# Patient Record
Sex: MALE | Race: WHITE | NOT HISPANIC OR LATINO | URBAN - METROPOLITAN AREA
[De-identification: names, ages, dates, MRNs, and addresses within clinical notes are randomized per-mention and may not be internally consistent; named-entity substitution may affect disease eponyms.]

---

## 2020-07-25 ENCOUNTER — TELEPHONE ENCOUNTER (OUTPATIENT)
Dept: URBAN - METROPOLITAN AREA CLINIC 13 | Facility: CLINIC | Age: 50
End: 2020-07-25

## 2020-07-25 RX ORDER — PREDNISONE 10 MG/1
4 TABLETS DAILY X 2 WKS, 3 TABLETS DAILY X 1 WK, 2 TABLETS DAILY X 1 WK, 1 TABLET DAILY X 1 WK, THEN 1/2 TABLET DAILY X 1 WK TABLET ORAL
Qty: 102 | Refills: 0 | OUTPATIENT
Start: 2015-06-14 | End: 2016-05-05

## 2020-07-25 RX ORDER — PREDNISONE 20 MG/1
TAKE 2 TABLETS DAILY FOR 7 DAYS; 1 TAB DAILY FOR 7 DAYS; AND 1/2 TAB DAILY FOR 7 DAYS THEN STOP TABLET ORAL
Qty: 56 | Refills: 0 | OUTPATIENT
Start: 2016-05-05 | End: 2016-08-26

## 2020-07-25 RX ORDER — SODIUM SULFATE, POTASSIUM SULFATE, MAGNESIUM SULFATE 17.5; 3.13; 1.6 G/ML; G/ML; G/ML
DILUTE CONTENTS AND USE AS DIRECTED FOR BOWEL PREP SOLUTION, CONCENTRATE ORAL
Qty: 1 | Refills: 0 | OUTPATIENT
Start: 2019-10-29 | End: 2020-04-13

## 2020-07-25 RX ORDER — FENOFIBRATE 120 MG/1
TAKE 1 TABLET DAILY TABLET ORAL
Refills: 0 | OUTPATIENT
End: 2020-04-13

## 2020-07-25 RX ORDER — INFLIXIMAB 100 MG/10ML
USE AS DIRECTED INJECTION, POWDER, LYOPHILIZED, FOR SOLUTION INTRAVENOUS
Refills: 0 | OUTPATIENT
End: 2016-05-03

## 2020-07-25 RX ORDER — INFLIXIMAB 100 MG/10ML
INFUSE 5MG/KG Q 4 WEEKS INJECTION, POWDER, LYOPHILIZED, FOR SOLUTION INTRAVENOUS
Qty: 1 | Refills: 6 | OUTPATIENT
Start: 2016-08-29 | End: 2017-09-01

## 2020-07-25 RX ORDER — CHLORDIAZEPOXIDE HYDROCHLORIDE AND CLIDINIUM BROMIDE 5; 2.5 MG/1; MG/1
TAKE 1 CAPSULE 3 TIMES DAILY AS NEEDED CAPSULE, GELATIN COATED ORAL
Qty: 65 | Refills: 3 | OUTPATIENT
End: 2017-09-01

## 2020-07-25 RX ORDER — CHLORDIAZEPOXIDE HYDROCHLORIDE AND CLIDINIUM BROMIDE 5; 2.5 MG/1; MG/1
TAKE 1 CAPSULE 3 TIMES DAILY PRN PAIN CAPSULE ORAL
Qty: 45 | Refills: 0 | OUTPATIENT
Start: 2015-06-15 | End: 2015-08-19

## 2020-07-25 RX ORDER — PREDNISONE 20 MG/1
TAKE TABLET  PRN TABLET ORAL
Refills: 0 | OUTPATIENT
End: 2020-04-23

## 2020-07-25 RX ORDER — PROMETHAZINE HYDROCHLORIDE 25 MG/1
TAKE 1 TABLET EVERY 6 HOURS AS NEEDED FOR NAUSEA TABLET ORAL
Qty: 40 | Refills: 0 | OUTPATIENT
Start: 2016-05-04 | End: 2017-09-01

## 2020-07-25 RX ORDER — OXYCODONE AND ACETAMINOPHEN 10; 325 MG/1; MG/1
TAKE 1 TABLET EVERY 4 TO 6 HOURS AS NEEDED FOR PAIN TABLET ORAL
Refills: 0 | OUTPATIENT
End: 2016-05-03

## 2020-07-25 RX ORDER — HYOSCYAMINE SULFATE 0.12 MG/1
TAKE 1 TABLET EVERY 8 HOURS PRN TABLET, ORALLY DISINTEGRATING ORAL
Qty: 60 | Refills: 2 | OUTPATIENT
Start: 2015-08-19 | End: 2019-10-29

## 2020-07-26 ENCOUNTER — TELEPHONE ENCOUNTER (OUTPATIENT)
Dept: URBAN - METROPOLITAN AREA CLINIC 13 | Facility: CLINIC | Age: 50
End: 2020-07-26

## 2020-07-26 RX ORDER — METOPROLOL SUCCINATE 25 MG/1
TABLET, EXTENDED RELEASE ORAL
Qty: 30 | Refills: 0 | Status: ACTIVE | COMMUNITY
Start: 2013-12-30

## 2020-07-26 RX ORDER — OXYCODONE AND ACETAMINOPHEN 5; 325 MG/1; MG/1
TABLET ORAL
Qty: 30 | Refills: 0 | Status: ACTIVE | COMMUNITY
Start: 2013-09-02

## 2020-07-26 RX ORDER — OXYCODONE AND ACETAMINOPHEN 10; 325 MG/1; MG/1
TABLET ORAL
Qty: 60 | Refills: 0 | Status: ACTIVE | COMMUNITY
Start: 2014-08-08

## 2020-07-26 RX ORDER — FLUTICASONE PROPIONATE 50 UG/1
SPRAY, METERED NASAL
Qty: 16 | Refills: 0 | Status: ACTIVE | COMMUNITY
Start: 2011-12-19

## 2020-07-26 RX ORDER — DIPHENOXYLATE HCL/ATROPINE 2.5-.025MG
TAKE 1 TABLET EVERY 6 HOURS PRN TABLET ORAL
Qty: 60 | Refills: 3 | Status: ACTIVE | COMMUNITY

## 2020-07-26 RX ORDER — ONDANSETRON HYDROCHLORIDE 4 MG/1
TABLET, FILM COATED ORAL
Qty: 20 | Refills: 0 | Status: ACTIVE | COMMUNITY
Start: 2019-04-05

## 2020-07-26 RX ORDER — ZOLPIDEM TARTRATE 5 MG/1
TAKE 1 TABLET AT BEDTIME AS NEEDED TABLET, FILM COATED ORAL
Refills: 0 | Status: ACTIVE | COMMUNITY

## 2020-07-26 RX ORDER — DIPHENOXYLATE HYDROCHLORIDE AND ATROPINE SULFATE 2.5; .025 MG/1; MG/1
TABLET ORAL
Qty: 90 | Refills: 0 | Status: ACTIVE | COMMUNITY
Start: 2013-09-11

## 2020-07-26 RX ORDER — OXYCODONE AND ACETAMINOPHEN 5; 325 MG/1; MG/1
TAKE 1 TABLET EVERY 6 HOURS TAKE 1 TABLET BY MOUTH EVERY 6-8 HOURS AS NEEDED TABLET ORAL
Refills: 0 | Status: ACTIVE | COMMUNITY
Start: 2019-04-05

## 2020-07-26 RX ORDER — PREDNISONE 20 MG/1
TABLET ORAL
Qty: 10 | Refills: 0 | Status: ACTIVE | COMMUNITY
Start: 2011-06-20

## 2020-07-26 RX ORDER — MULTIVIT-MIN/FA/LYCOPEN/LUTEIN .4-300-25
TAKE 1 TABLET DAILY PRN TABLET ORAL
Refills: 0 | Status: ACTIVE | COMMUNITY

## 2020-07-26 RX ORDER — LOSARTAN POTASSIUM 50 MG/1
TABLET, FILM COATED ORAL
Qty: 90 | Refills: 0 | Status: ACTIVE | COMMUNITY
Start: 2020-01-10

## 2020-07-26 RX ORDER — PROMETHAZINE HYDROCHLORIDE AND CODEINE PHOSPHATE 6.25; 1 MG/5ML; MG/5ML
SOLUTION ORAL
Qty: 120 | Refills: 0 | Status: ACTIVE | COMMUNITY
Start: 2011-09-08

## 2020-07-26 RX ORDER — AZITHROMYCIN DIHYDRATE 250 MG/1
TABLET, FILM COATED ORAL
Qty: 6 | Refills: 0 | Status: ACTIVE | COMMUNITY
Start: 2011-12-19

## 2020-07-26 RX ORDER — ENOXAPARIN SODIUM 100 MG/ML
INJECTION SUBCUTANEOUS
Qty: 28 | Refills: 0 | Status: ACTIVE | COMMUNITY
Start: 2013-09-03

## 2020-07-26 RX ORDER — OXYCODONE AND ACETAMINOPHEN 5; 325 MG/1; MG/1
TABLET ORAL
Qty: 60 | Refills: 0 | Status: ACTIVE | COMMUNITY
Start: 2014-03-05

## 2020-07-26 RX ORDER — INFLIXIMAB 100 MG/10ML
INFUSE 5MG/KG Q 4 WEEKS INJECTION, POWDER, LYOPHILIZED, FOR SOLUTION INTRAVENOUS
Qty: 1 | Refills: 6 | Status: ACTIVE | COMMUNITY
Start: 2017-02-17

## 2020-07-26 RX ORDER — COLCHICINE 0.6 MG/1
TAKE 1 TABLET DAILY AS DIRECTED TABLET, FILM COATED ORAL
Refills: 0 | Status: ACTIVE | COMMUNITY

## 2020-07-26 RX ORDER — FLUCONAZOLE 100 MG/1
TABLET ORAL
Qty: 10 | Refills: 0 | Status: ACTIVE | COMMUNITY
Start: 2019-04-05

## 2020-07-26 RX ORDER — COLCHICINE 0.6 MG/1
TABLET, FILM COATED ORAL
Qty: 30 | Refills: 0 | Status: ACTIVE | COMMUNITY
Start: 2013-09-03

## 2020-07-26 RX ORDER — ACETAMINOPHEN AND CODEINE PHOSPHATE 300; 30 MG/1; MG/1
TABLET ORAL
Qty: 25 | Refills: 0 | Status: ACTIVE | COMMUNITY
Start: 2013-05-06

## 2020-07-26 RX ORDER — METOPROLOL SUCCINATE 50 MG/1
TAKE 1 TABLET DAILY TABLET, EXTENDED RELEASE ORAL
Refills: 0 | Status: ACTIVE | COMMUNITY

## 2020-07-26 RX ORDER — METOPROLOL SUCCINATE 25 MG/1
TABLET, EXTENDED RELEASE ORAL
Qty: 30 | Refills: 0 | Status: ACTIVE | COMMUNITY
Start: 2013-09-16

## 2020-07-26 RX ORDER — METOPROLOL SUCCINATE 100 MG/1
TABLET, EXTENDED RELEASE ORAL
Qty: 90 | Refills: 0 | Status: ACTIVE | COMMUNITY
Start: 2019-12-20

## 2020-07-26 RX ORDER — MUPIROCIN 20 MG/G
OINTMENT TOPICAL
Qty: 22 | Refills: 0 | Status: ACTIVE | COMMUNITY
Start: 2012-02-26

## 2020-07-26 RX ORDER — BENZONATATE 200 MG/1
CAPSULE ORAL
Qty: 20 | Refills: 0 | Status: ACTIVE | COMMUNITY
Start: 2011-09-08

## 2020-07-26 RX ORDER — METOPROLOL SUCCINATE 100 MG/1
TABLET, EXTENDED RELEASE ORAL
Qty: 90 | Refills: 0 | Status: ACTIVE | COMMUNITY
Start: 2019-12-17

## 2020-07-26 RX ORDER — COLCHICINE 0.6 MG/1
TABLET, FILM COATED ORAL
Qty: 30 | Refills: 0 | Status: ACTIVE | COMMUNITY
Start: 2014-02-10

## 2020-07-26 RX ORDER — DICYCLOMINE HYDROCHLORIDE 10 MG/1
TAKE ONE CAPSULE BY MOUTH EVERY 4 TO 6 HOURS AS NEEDED FOR ABDOMINAL PAIN CAPSULE ORAL
Qty: 60 | Refills: 1 | Status: ACTIVE | COMMUNITY
Start: 2019-10-29

## 2020-07-26 RX ORDER — TESTOSTERONE 16.2 MG/G
GEL TRANSDERMAL
Qty: 75 | Refills: 0 | Status: ACTIVE | COMMUNITY
Start: 2014-02-08

## 2020-07-26 RX ORDER — OMEPRAZOLE 20 MG/1
CAPSULE, DELAYED RELEASE ORAL
Qty: 30 | Refills: 0 | Status: ACTIVE | COMMUNITY
Start: 2013-11-08

## 2020-07-26 RX ORDER — OMEPRAZOLE 20 MG/1
TAKE 1 TABLET DAILY PRN TABLET, DELAYED RELEASE ORAL
Refills: 0 | Status: ACTIVE | COMMUNITY

## 2020-07-26 RX ORDER — ZOLPIDEM TARTRATE 5 MG/1
TABLET, FILM COATED ORAL
Qty: 30 | Refills: 0 | Status: ACTIVE | COMMUNITY
Start: 2013-09-16

## 2020-07-26 RX ORDER — METOPROLOL SUCCINATE 100 MG/1
TABLET, EXTENDED RELEASE ORAL
Qty: 90 | Refills: 0 | Status: ACTIVE | COMMUNITY
Start: 2019-04-05

## 2020-07-26 RX ORDER — OMEPRAZOLE 20 MG/1
CAPSULE, DELAYED RELEASE ORAL
Qty: 30 | Refills: 0 | Status: ACTIVE | COMMUNITY
Start: 2013-09-03

## 2020-07-26 RX ORDER — LOSARTAN POTASSIUM 50 MG/1
TABLET, FILM COATED ORAL
Qty: 90 | Refills: 0 | Status: ACTIVE | COMMUNITY
Start: 2020-07-15

## 2020-07-26 RX ORDER — CEPHALEXIN 500 MG/1
CAPSULE ORAL
Qty: 21 | Refills: 0 | Status: ACTIVE | COMMUNITY
Start: 2012-02-26

## 2020-07-26 RX ORDER — ZOLPIDEM TARTRATE 5 MG/1
TABLET, FILM COATED ORAL
Qty: 30 | Refills: 0 | Status: ACTIVE | COMMUNITY
Start: 2013-10-30

## 2020-07-28 ENCOUNTER — OFFICE VISIT (OUTPATIENT)
Dept: URBAN - METROPOLITAN AREA CLINIC 113 | Facility: CLINIC | Age: 50
End: 2020-07-28

## 2021-01-25 ENCOUNTER — CLAIMS CREATED FROM THE CLAIM WINDOW (OUTPATIENT)
Dept: URBAN - METROPOLITAN AREA MEDICAL CENTER 19 | Facility: MEDICAL CENTER | Age: 51
End: 2021-01-25
Payer: COMMERCIAL

## 2021-01-25 DIAGNOSIS — K76.0 FATTY (CHANGE OF) LIVER, NOT ELSEWHERE CLASSIFIED: ICD-10-CM

## 2021-01-25 DIAGNOSIS — K50.812 CROHN'S DISEASE OF BOTH SMALL AND LARGE INTESTINE WITH INTESTINAL OBSTRUCTION: ICD-10-CM

## 2021-01-25 DIAGNOSIS — D72.829 ELEVATED WHITE BLOOD CELL COUNT, UNSPECIFIED: ICD-10-CM

## 2021-01-25 PROCEDURE — 99253 IP/OBS CNSLTJ NEW/EST LOW 45: CPT | Performed by: INTERNAL MEDICINE

## 2021-01-26 ENCOUNTER — CLAIMS CREATED FROM THE CLAIM WINDOW (OUTPATIENT)
Dept: URBAN - METROPOLITAN AREA MEDICAL CENTER 19 | Facility: MEDICAL CENTER | Age: 51
End: 2021-01-26
Payer: COMMERCIAL

## 2021-01-26 DIAGNOSIS — K76.0 FATTY (CHANGE OF) LIVER, NOT ELSEWHERE CLASSIFIED: ICD-10-CM

## 2021-01-26 DIAGNOSIS — K50.812 CROHN'S DISEASE OF BOTH SMALL AND LARGE INTESTINE WITH INTESTINAL OBSTRUCTION: ICD-10-CM

## 2021-01-26 PROCEDURE — 99232 SBSQ HOSP IP/OBS MODERATE 35: CPT | Performed by: INTERNAL MEDICINE

## 2021-02-12 ENCOUNTER — TELEPHONE ENCOUNTER (OUTPATIENT)
Dept: URBAN - METROPOLITAN AREA CLINIC 113 | Facility: CLINIC | Age: 51
End: 2021-02-12

## 2021-02-12 RX ORDER — USTEKINUMAB 130 MG/26ML
AS DIRECTED SOLUTION INTRAVENOUS
Qty: 3 | Refills: 0 | OUTPATIENT
Start: 2021-02-12 | End: 2021-02-13

## 2021-02-19 ENCOUNTER — LAB OUTSIDE AN ENCOUNTER (OUTPATIENT)
Dept: URBAN - METROPOLITAN AREA CLINIC 113 | Facility: CLINIC | Age: 51
End: 2021-02-19

## 2021-09-08 ENCOUNTER — TELEPHONE ENCOUNTER (OUTPATIENT)
Dept: URBAN - METROPOLITAN AREA CLINIC 70 | Facility: CLINIC | Age: 51
End: 2021-09-08

## 2021-10-10 LAB
A/G RATIO: 1
ALBUMIN: 3.8
ALKALINE PHOSPHATASE: 68
ALT (SGPT): 13
AST (SGOT): 16
BASO (ABSOLUTE): 0.1
BASOS: 1
BILIRUBIN, TOTAL: 0.3
BUN/CREATININE RATIO: 21
BUN: 32
CALCIUM: 8.9
CARBON DIOXIDE, TOTAL: 22
CHLORIDE: 105
CREATININE: 1.54
EGFR IF AFRICN AM: 59
EGFR IF NONAFRICN AM: 51
EOS (ABSOLUTE): 0.4
EOS: 5
GLOBULIN, TOTAL: 3.9
GLUCOSE: 100
HBSAG SCREEN: NEGATIVE
HEMATOCRIT: 37.3
HEMATOLOGY COMMENTS:: (no result)
HEMOGLOBIN: 11.9
IMMATURE CELLS: (no result)
IMMATURE GRANS (ABS): 0
IMMATURE GRANULOCYTES: 0
LYMPHS (ABSOLUTE): 2.1
LYMPHS: 27
MCH: 29.2
MCHC: 31.9
MCV: 92
MONOCYTES(ABSOLUTE): 0.5
MONOCYTES: 7
NEUTROPHILS (ABSOLUTE): 4.5
NEUTROPHILS: 60
NRBC: (no result)
PLATELETS: 325
POTASSIUM: 4
PROTEIN, TOTAL: 7.7
QUANTIFERON CRITERIA: (no result)
QUANTIFERON INCUBATION: (no result)
QUANTIFERON MITOGEN VALUE: >10
QUANTIFERON NIL VALUE: 0.01
QUANTIFERON TB1 AG VALUE: 0.01
QUANTIFERON TB2 AG VALUE: 0.01
QUANTIFERON-TB GOLD PLUS: NEGATIVE
RBC: 4.07
RDW: 13.1
SODIUM: 141
WBC: 7.5

## 2021-10-13 ENCOUNTER — WEB ENCOUNTER (OUTPATIENT)
Dept: URBAN - METROPOLITAN AREA CLINIC 113 | Facility: CLINIC | Age: 51
End: 2021-10-13

## 2021-10-13 ENCOUNTER — OFFICE VISIT (OUTPATIENT)
Dept: URBAN - METROPOLITAN AREA CLINIC 113 | Facility: CLINIC | Age: 51
End: 2021-10-13
Payer: COMMERCIAL

## 2021-10-13 VITALS
SYSTOLIC BLOOD PRESSURE: 168 MMHG | HEIGHT: 71 IN | HEART RATE: 68 BPM | DIASTOLIC BLOOD PRESSURE: 85 MMHG | BODY MASS INDEX: 30.1 KG/M2 | WEIGHT: 215 LBS | RESPIRATION RATE: 20 BRPM | TEMPERATURE: 98.4 F

## 2021-10-13 DIAGNOSIS — K50.80 CROHN'S COLITIS: ICD-10-CM

## 2021-10-13 PROBLEM — 34000006 CROHNS DISEASE: Status: ACTIVE | Noted: 2021-02-12

## 2021-10-13 PROCEDURE — 99214 OFFICE O/P EST MOD 30 MIN: CPT | Performed by: INTERNAL MEDICINE

## 2021-10-13 RX ORDER — COLCHICINE 0.6 MG/1
TAKE 1 TABLET DAILY AS DIRECTED TABLET, FILM COATED ORAL
Refills: 0 | Status: ACTIVE | COMMUNITY

## 2021-10-13 RX ORDER — USTEKINUMAB 130 MG/26ML
AS DIRECTED SOLUTION INTRAVENOUS ONCE
Qty: 520 MILLIGRAMS | Refills: 0 | OUTPATIENT
Start: 2021-10-13 | End: 2021-10-14

## 2021-10-13 RX ORDER — METOPROLOL SUCCINATE 25 MG/1
TABLET, EXTENDED RELEASE ORAL
Qty: 30 | Refills: 0 | Status: ON HOLD | COMMUNITY
Start: 2013-09-16

## 2021-10-13 RX ORDER — FLUOXETINE HYDROCHLORIDE 20 MG/1
1 CAPSULE CAPSULE ORAL ONCE A DAY
Status: ACTIVE | COMMUNITY

## 2021-10-13 RX ORDER — TRAMADOL HYDROCHLORIDE 50 MG/1
1 TABLET AS NEEDED TABLET, FILM COATED ORAL ONCE A DAY
Status: ACTIVE | COMMUNITY

## 2021-10-13 RX ORDER — MULTIVIT-MIN/FA/LYCOPEN/LUTEIN .4-300-25
TAKE 1 TABLET DAILY PRN TABLET ORAL
Refills: 0 | Status: ON HOLD | COMMUNITY

## 2021-10-13 RX ORDER — ENOXAPARIN SODIUM 100 MG/ML
INJECTION SUBCUTANEOUS
Qty: 28 | Refills: 0 | Status: ON HOLD | COMMUNITY
Start: 2013-09-03

## 2021-10-13 RX ORDER — USTEKINUMAB 90 MG/ML
AS DIRECTED INJECTION, SOLUTION SUBCUTANEOUS
Qty: 90 MILLIGRAMS | Refills: 0 | OUTPATIENT
Start: 2021-10-13 | End: 2022-01-10

## 2021-10-13 RX ORDER — OMEPRAZOLE 20 MG/1
CAPSULE, DELAYED RELEASE ORAL
Qty: 30 | Refills: 0 | Status: ON HOLD | COMMUNITY
Start: 2013-09-03

## 2021-10-13 RX ORDER — DICYCLOMINE HYDROCHLORIDE 10 MG/1
TAKE ONE CAPSULE BY MOUTH EVERY 4 TO 6 HOURS AS NEEDED FOR ABDOMINAL PAIN CAPSULE ORAL
Qty: 60 | Refills: 1 | Status: ON HOLD | COMMUNITY
Start: 2019-10-29

## 2021-10-13 RX ORDER — METOPROLOL SUCCINATE 50 MG/1
TAKE 1 TABLET DAILY TABLET, EXTENDED RELEASE ORAL
Refills: 0 | Status: ON HOLD | COMMUNITY

## 2021-10-13 RX ORDER — DIPHENOXYLATE HCL/ATROPINE 2.5-.025MG
1 TABLET TABLET ORAL
Refills: 3 | Status: ACTIVE | COMMUNITY

## 2021-10-13 RX ORDER — OMEPRAZOLE 20 MG/1
TAKE 1 TABLET DAILY PRN TABLET, DELAYED RELEASE ORAL
Refills: 0 | Status: ACTIVE | COMMUNITY

## 2021-10-13 RX ORDER — OXYCODONE AND ACETAMINOPHEN 5; 325 MG/1; MG/1
TABLET ORAL
Qty: 30 | Refills: 0 | Status: ON HOLD | COMMUNITY
Start: 2013-09-02

## 2021-10-13 RX ORDER — MUPIROCIN 20 MG/G
OINTMENT TOPICAL
Qty: 22 | Refills: 0 | Status: ON HOLD | COMMUNITY
Start: 2012-02-26

## 2021-10-13 RX ORDER — BENZONATATE 200 MG/1
CAPSULE ORAL
Qty: 20 | Refills: 0 | Status: ON HOLD | COMMUNITY
Start: 2011-09-08

## 2021-10-13 RX ORDER — DIPHENOXYLATE HYDROCHLORIDE AND ATROPINE SULFATE 2.5; .025 MG/1; MG/1
TABLET ORAL
Qty: 90 | Refills: 0 | Status: ON HOLD | COMMUNITY
Start: 2013-09-11

## 2021-10-13 RX ORDER — PROMETHAZINE HYDROCHLORIDE AND CODEINE PHOSPHATE 6.25; 1 MG/5ML; MG/5ML
SOLUTION ORAL
Qty: 120 | Refills: 0 | Status: ON HOLD | COMMUNITY
Start: 2011-09-08

## 2021-10-13 RX ORDER — CEPHALEXIN 500 MG/1
CAPSULE ORAL
Qty: 21 | Refills: 0 | Status: ON HOLD | COMMUNITY
Start: 2012-02-26

## 2021-10-13 RX ORDER — LOSARTAN POTASSIUM 50 MG/1
1 TABLET TABLET, FILM COATED ORAL ONCE A DAY
Refills: 0 | Status: ACTIVE | COMMUNITY
Start: 2020-01-10

## 2021-10-13 RX ORDER — ACETAMINOPHEN AND CODEINE PHOSPHATE 300; 30 MG/1; MG/1
TABLET ORAL
Qty: 25 | Refills: 0 | Status: ON HOLD | COMMUNITY
Start: 2013-05-06

## 2021-10-13 RX ORDER — ONDANSETRON HYDROCHLORIDE 4 MG/1
1 TABLET TABLET, FILM COATED ORAL
Refills: 0 | Status: ACTIVE | COMMUNITY
Start: 2019-04-05

## 2021-10-13 RX ORDER — TESTOSTERONE 16.2 MG/G
GEL TRANSDERMAL
Qty: 75 | Refills: 0 | Status: ON HOLD | COMMUNITY
Start: 2014-02-08

## 2021-10-13 RX ORDER — PREDNISONE 20 MG/1
1 TABLET TABLET ORAL
Refills: 0 | Status: ON HOLD | COMMUNITY
Start: 2011-06-20

## 2021-10-13 RX ORDER — METOPROLOL SUCCINATE 100 MG/1
TABLET, EXTENDED RELEASE ORAL
Qty: 90 | Refills: 0 | Status: ACTIVE | COMMUNITY
Start: 2019-04-05

## 2021-10-13 RX ORDER — ZOLPIDEM TARTRATE 5 MG/1
TAKE 1 TABLET AT BEDTIME AS NEEDED TABLET, FILM COATED ORAL
Refills: 0 | Status: ACTIVE | COMMUNITY

## 2021-10-13 RX ORDER — OXYCODONE HYDROCHLORIDE AND ACETAMINOPHEN 5; 325 MG/1; MG/1
1 TABLET AS NEEDED TABLET ORAL
Status: ACTIVE | COMMUNITY

## 2021-10-13 RX ORDER — INFLIXIMAB 100 MG/10ML
INFUSE 5MG/KG Q 4 WEEKS INJECTION, POWDER, LYOPHILIZED, FOR SOLUTION INTRAVENOUS
Qty: 1 | Refills: 6 | Status: ON HOLD | COMMUNITY
Start: 2017-02-17

## 2021-10-13 RX ORDER — ROPINIROLE HYDROCHLORIDE 1 MG/1
1 TABLET TABLET, FILM COATED ORAL TWICE A DAY
Status: ACTIVE | COMMUNITY

## 2021-10-13 RX ORDER — FLUTICASONE PROPIONATE 50 UG/1
SPRAY, METERED NASAL
Qty: 16 | Refills: 0 | Status: ON HOLD | COMMUNITY
Start: 2011-12-19

## 2021-10-13 RX ORDER — FLUCONAZOLE 100 MG/1
TABLET ORAL
Qty: 10 | Refills: 0 | Status: ON HOLD | COMMUNITY
Start: 2019-04-05

## 2021-10-13 RX ORDER — AZITHROMYCIN DIHYDRATE 250 MG/1
TABLET, FILM COATED ORAL
Qty: 6 | Refills: 0 | Status: ON HOLD | COMMUNITY
Start: 2011-12-19

## 2021-10-13 RX ORDER — HYOSCYAMINE SULFATE 0.12 MG/1
1 TABLET UNDER THE TONGUE AND ALLOW TO DISSOLVE  AS NEEDED TABLET, ORALLY DISINTEGRATING ORAL THREE TIMES A DAY
Status: ACTIVE | COMMUNITY

## 2021-10-13 RX ORDER — OXYCODONE AND ACETAMINOPHEN 10; 325 MG/1; MG/1
TABLET ORAL
Qty: 60 | Refills: 0 | Status: ON HOLD | COMMUNITY
Start: 2014-08-08

## 2021-10-13 NOTE — HPI-TODAY'S VISIT:
52 yo male with Crohn's disease complicated by enterocutaneous fistulas, abdominal adhesions, abdominal abscess, status post ileostomy with subsequent reversal and partial colectomy, presenting for follow up.  He was last seen by Dr. Bhat in April 2020 by telemedicine. 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 He was to continue Remicade 5 mg/kg every 4 weeks and daily stool softener. He was to use MiraLAX adn MOM proactively for bowel maintenance.  He was hospitalized in January 2021 for a large bowel obstruction with significant dilation of the large bowel measuring up to 1.07 cm with transition point in the right lower quadrant with severe narrowing of the distal sigmoid colon noted on CT imaging. He was seen in consultation by Dr. Reaves. He was felt to need surgery with subtotal colectomy and end ileostomy. He was treated with corticosteroids for possible inflammatory component of stricture. Remicade was likely going to require transition to an alternate biologic medication given his disease progression on Remicade.  He underwent an open Roger's procedure on 1/27/21. The operation was 8 hours long, complicated by severe intra-abdominal adhesions requiring 4 hours of adhesiolysis. Dr. Mc recommended the colostomy be considered permanent. His biologic therapy was changed to Stelara 90 mg every 8 weeks.  He tells me that his insurance company is not allowing him to continue with Stelara injections until a TB test was completely. He had the infusion portion of induction in March 2021. He has never had any Stelara injections. He empties his ostomy at least 3 times daily, and at most 6 times daily. Stools per ostomy are liquid. He uses Lomotil, which does not seem to help with thickening up his stools. There is no blood per rectum. There is rare abdominal cramping. He does admit some gas. There is no fever or chills. He does admit some joint pains, mostly feet, knees and ankles. He has trouble with shoulders and elbows with heavy lifting.

## 2021-11-04 ENCOUNTER — TELEPHONE ENCOUNTER (OUTPATIENT)
Dept: URBAN - METROPOLITAN AREA CLINIC 19 | Facility: CLINIC | Age: 51
End: 2021-11-04

## 2022-01-26 ENCOUNTER — TELEPHONE ENCOUNTER (OUTPATIENT)
Dept: URBAN - METROPOLITAN AREA CLINIC 113 | Facility: CLINIC | Age: 52
End: 2022-01-26

## 2022-01-26 RX ORDER — USTEKINUMAB 90 MG/ML
AS DIRECTED INJECTION, SOLUTION SUBCUTANEOUS
Qty: 90 MILLIGRAMS | Refills: 6 | OUTPATIENT
Start: 2022-01-26 | End: 2023-10-18

## 2022-02-25 ENCOUNTER — TELEPHONE ENCOUNTER (OUTPATIENT)
Dept: URBAN - METROPOLITAN AREA CLINIC 113 | Facility: CLINIC | Age: 52
End: 2022-02-25

## 2022-11-02 ENCOUNTER — OFFICE VISIT (OUTPATIENT)
Dept: URBAN - METROPOLITAN AREA CLINIC 113 | Facility: CLINIC | Age: 52
End: 2022-11-02
Payer: COMMERCIAL

## 2022-11-02 VITALS
HEIGHT: 71 IN | HEART RATE: 79 BPM | WEIGHT: 250 LBS | RESPIRATION RATE: 20 BRPM | SYSTOLIC BLOOD PRESSURE: 153 MMHG | DIASTOLIC BLOOD PRESSURE: 84 MMHG | TEMPERATURE: 97.8 F | BODY MASS INDEX: 35 KG/M2

## 2022-11-02 DIAGNOSIS — K50.812 CROHN'S DISEASE OF BOTH SMALL AND LARGE INTESTINE WITH INTESTINAL OBSTRUCTION: ICD-10-CM

## 2022-11-02 DIAGNOSIS — Z79.899 HIGH RISK MEDICATION USE: ICD-10-CM

## 2022-11-02 DIAGNOSIS — K76.0 FATTY (CHANGE OF) LIVER, NOT ELSEWHERE CLASSIFIED: ICD-10-CM

## 2022-11-02 PROCEDURE — 99214 OFFICE O/P EST MOD 30 MIN: CPT | Performed by: INTERNAL MEDICINE

## 2022-11-02 RX ORDER — FLUCONAZOLE 100 MG/1
TABLET ORAL
Qty: 10 | Refills: 0 | Status: ON HOLD | COMMUNITY
Start: 2019-04-05

## 2022-11-02 RX ORDER — DICYCLOMINE HYDROCHLORIDE 10 MG/1
TAKE ONE CAPSULE BY MOUTH EVERY 4 TO 6 HOURS AS NEEDED FOR ABDOMINAL PAIN CAPSULE ORAL
Qty: 60 | Refills: 1 | Status: ON HOLD | COMMUNITY
Start: 2019-10-29

## 2022-11-02 RX ORDER — DIPHENOXYLATE HYDROCHLORIDE AND ATROPINE SULFATE 2.5; .025 MG/1; MG/1
TABLET ORAL
Qty: 90 | Refills: 0 | Status: ON HOLD | COMMUNITY
Start: 2013-09-11

## 2022-11-02 RX ORDER — ONDANSETRON HYDROCHLORIDE 4 MG/1
1 TABLET TABLET, FILM COATED ORAL
Refills: 0 | Status: ACTIVE | COMMUNITY
Start: 2019-04-05

## 2022-11-02 RX ORDER — HYOSCYAMINE SULFATE 0.12 MG/1
1 TABLET UNDER THE TONGUE AND ALLOW TO DISSOLVE  AS NEEDED TABLET, ORALLY DISINTEGRATING ORAL THREE TIMES A DAY
Status: ACTIVE | COMMUNITY

## 2022-11-02 RX ORDER — OXYCODONE AND ACETAMINOPHEN 5; 325 MG/1; MG/1
TABLET ORAL
Qty: 30 | Refills: 0 | Status: ON HOLD | COMMUNITY
Start: 2013-09-02

## 2022-11-02 RX ORDER — LOSARTAN POTASSIUM 50 MG/1
1 TABLET TABLET, FILM COATED ORAL ONCE A DAY
Refills: 0 | Status: ON HOLD | COMMUNITY
Start: 2020-01-10

## 2022-11-02 RX ORDER — CEPHALEXIN 500 MG/1
CAPSULE ORAL
Qty: 21 | Refills: 0 | Status: ON HOLD | COMMUNITY
Start: 2012-02-26

## 2022-11-02 RX ORDER — TRAMADOL HYDROCHLORIDE 50 MG/1
1 TABLET AS NEEDED TABLET, FILM COATED ORAL ONCE A DAY
Status: ON HOLD | COMMUNITY

## 2022-11-02 RX ORDER — METOPROLOL SUCCINATE 100 MG/1
1 TABLET TABLET, EXTENDED RELEASE ORAL ONCE A DAY
Refills: 0 | Status: ACTIVE | COMMUNITY
Start: 2019-04-05

## 2022-11-02 RX ORDER — METOPROLOL SUCCINATE 50 MG/1
TAKE 1 TABLET DAILY TABLET, EXTENDED RELEASE ORAL
Refills: 0 | Status: ON HOLD | COMMUNITY

## 2022-11-02 RX ORDER — PREDNISONE 20 MG/1
1 TABLET TABLET ORAL
Refills: 0 | Status: ON HOLD | COMMUNITY
Start: 2011-06-20

## 2022-11-02 RX ORDER — OMEPRAZOLE 20 MG/1
CAPSULE, DELAYED RELEASE ORAL
Qty: 30 | Refills: 0 | Status: ON HOLD | COMMUNITY
Start: 2013-09-03

## 2022-11-02 RX ORDER — COLCHICINE 0.6 MG/1
TAKE 1 TABLET DAILY AS DIRECTED TABLET, FILM COATED ORAL
Refills: 0 | Status: ACTIVE | COMMUNITY

## 2022-11-02 RX ORDER — OXYCODONE AND ACETAMINOPHEN 10; 325 MG/1; MG/1
TABLET ORAL
Qty: 60 | Refills: 0 | Status: ON HOLD | COMMUNITY
Start: 2014-08-08

## 2022-11-02 RX ORDER — DIPHENOXYLATE HCL/ATROPINE 2.5-.025MG
3 TABLET TABLET ORAL TWICE A DAY
Refills: 3 | Status: ACTIVE | COMMUNITY

## 2022-11-02 RX ORDER — OXYCODONE HYDROCHLORIDE AND ACETAMINOPHEN 5; 325 MG/1; MG/1
1 TABLET AS NEEDED TABLET ORAL
Status: ACTIVE | COMMUNITY

## 2022-11-02 RX ORDER — ENOXAPARIN SODIUM 100 MG/ML
INJECTION SUBCUTANEOUS
Qty: 28 | Refills: 0 | Status: ON HOLD | COMMUNITY
Start: 2013-09-03

## 2022-11-02 RX ORDER — FLUTICASONE PROPIONATE 50 UG/1
SPRAY, METERED NASAL
Qty: 16 | Refills: 0 | Status: ON HOLD | COMMUNITY
Start: 2011-12-19

## 2022-11-02 RX ORDER — USTEKINUMAB 90 MG/ML
AS DIRECTED INJECTION, SOLUTION SUBCUTANEOUS
Qty: 90 MILLIGRAMS | Refills: 6 | Status: ACTIVE | COMMUNITY
Start: 2022-01-26 | End: 2023-10-18

## 2022-11-02 RX ORDER — TESTOSTERONE 16.2 MG/G
GEL TRANSDERMAL
Qty: 75 | Refills: 0 | Status: ON HOLD | COMMUNITY
Start: 2014-02-08

## 2022-11-02 RX ORDER — ROPINIROLE HYDROCHLORIDE 1 MG/1
1 TABLET TABLET, FILM COATED ORAL TWICE A DAY
Status: ACTIVE | COMMUNITY

## 2022-11-02 RX ORDER — PROMETHAZINE HYDROCHLORIDE AND CODEINE PHOSPHATE 6.25; 1 MG/5ML; MG/5ML
SOLUTION ORAL
Qty: 120 | Refills: 0 | Status: ON HOLD | COMMUNITY
Start: 2011-09-08

## 2022-11-02 RX ORDER — BENZONATATE 200 MG/1
CAPSULE ORAL
Qty: 20 | Refills: 0 | Status: ON HOLD | COMMUNITY
Start: 2011-09-08

## 2022-11-02 RX ORDER — MULTIVIT-MIN/FA/LYCOPEN/LUTEIN .4-300-25
TAKE 1 TABLET DAILY PRN TABLET ORAL
Refills: 0 | Status: ON HOLD | COMMUNITY

## 2022-11-02 RX ORDER — AMLODIPINE BESYLATE AND BENAZEPRIL HYDROCHLORIDE 5; 40 MG/1; MG/1
AS DIRECTED CAPSULE ORAL
Status: ACTIVE | COMMUNITY

## 2022-11-02 RX ORDER — FLUOXETINE HYDROCHLORIDE 20 MG/1
1 CAPSULE CAPSULE ORAL ONCE A DAY
Status: ACTIVE | COMMUNITY

## 2022-11-02 RX ORDER — ACETAMINOPHEN AND CODEINE PHOSPHATE 300; 30 MG/1; MG/1
TABLET ORAL
Qty: 25 | Refills: 0 | Status: ON HOLD | COMMUNITY
Start: 2013-05-06

## 2022-11-02 RX ORDER — OMEPRAZOLE 20 MG/1
TAKE 1 TABLET DAILY PRN TABLET, DELAYED RELEASE ORAL
Refills: 0 | Status: ACTIVE | COMMUNITY

## 2022-11-02 RX ORDER — METOPROLOL SUCCINATE 25 MG/1
TABLET, EXTENDED RELEASE ORAL
Qty: 30 | Refills: 0 | Status: ON HOLD | COMMUNITY
Start: 2013-09-16

## 2022-11-02 RX ORDER — INFLIXIMAB 100 MG/10ML
INFUSE 5MG/KG Q 4 WEEKS INJECTION, POWDER, LYOPHILIZED, FOR SOLUTION INTRAVENOUS
Qty: 1 | Refills: 6 | Status: ON HOLD | COMMUNITY
Start: 2017-02-17

## 2022-11-02 RX ORDER — AZITHROMYCIN DIHYDRATE 250 MG/1
TABLET, FILM COATED ORAL
Qty: 6 | Refills: 0 | Status: ON HOLD | COMMUNITY
Start: 2011-12-19

## 2022-11-02 RX ORDER — MUPIROCIN 20 MG/G
OINTMENT TOPICAL
Qty: 22 | Refills: 0 | Status: ON HOLD | COMMUNITY
Start: 2012-02-26

## 2022-11-02 RX ORDER — USTEKINUMAB 90 MG/ML
AS DIRECTED INJECTION, SOLUTION SUBCUTANEOUS
Qty: 90 MILLIGRAMS | Refills: 6
Start: 2022-01-26 | End: 2024-07-24

## 2022-11-02 RX ORDER — ZOLPIDEM TARTRATE 5 MG/1
TAKE 1 TABLET AT BEDTIME AS NEEDED TABLET, FILM COATED ORAL
Refills: 0 | Status: ON HOLD | COMMUNITY

## 2022-11-02 RX ORDER — CYANOCOBALAMIN 1000 UG/ML
AS DIRECTED INJECTION INTRAMUSCULAR; SUBCUTANEOUS
Status: ACTIVE | COMMUNITY

## 2022-11-02 RX ORDER — TESTOSTERONE CYPIONATE 200 MG/ML
2 ML INJECTION INTRAMUSCULAR
Status: ACTIVE | COMMUNITY

## 2022-11-02 NOTE — PHYSICAL EXAM GASTROINTESTINAL
Abdomen is soft, nontender, nondistended , no rebound or guarding. No organomegaly appreciated.  ostomy noted.  multiple areas of scarring with no active fisulization

## 2022-11-02 NOTE — HPI-TODAY'S VISIT:
Patient returns today in follow-up.  He is doing very well.  No abdominal pain.  Ostomy output is stable.  No bleeding.  No significant rectal output.  Denies any chest pain or shortness of breath.  No cough or fevers.  He has been on Stelara for greater than a year.  Seems to be doing quite well with it.  Denies any local injection reactions.  Most recent labs I have from July showed a normal CBC and CMP.  He had a CMP repeated in October which was also normal.  Denies any jaundice.  His last TB and hepatitis testing was a year ago.

## 2022-11-04 LAB
HEPATITIS B SURFACE ANTIGEN: (no result)
MITOGEN-NIL: >10
NIL: 0.03
QUANTIFERON(R)-TB GOLD: NEGATIVE
TB1-NIL: 0
TB2-NIL: 0

## 2022-11-14 ENCOUNTER — TELEPHONE ENCOUNTER (OUTPATIENT)
Dept: URBAN - METROPOLITAN AREA CLINIC 113 | Facility: CLINIC | Age: 52
End: 2022-11-14

## 2023-10-24 ENCOUNTER — OFFICE VISIT (OUTPATIENT)
Dept: URBAN - METROPOLITAN AREA CLINIC 113 | Facility: CLINIC | Age: 53
End: 2023-10-24
Payer: COMMERCIAL

## 2023-10-24 ENCOUNTER — LAB OUTSIDE AN ENCOUNTER (OUTPATIENT)
Dept: URBAN - METROPOLITAN AREA CLINIC 113 | Facility: CLINIC | Age: 53
End: 2023-10-24

## 2023-10-24 VITALS
SYSTOLIC BLOOD PRESSURE: 160 MMHG | BODY MASS INDEX: 33.18 KG/M2 | DIASTOLIC BLOOD PRESSURE: 84 MMHG | RESPIRATION RATE: 14 BRPM | TEMPERATURE: 97.6 F | HEIGHT: 71 IN | WEIGHT: 237 LBS | HEART RATE: 73 BPM

## 2023-10-24 DIAGNOSIS — Z79.899 HIGH RISK MEDICATION USE: ICD-10-CM

## 2023-10-24 DIAGNOSIS — K50.812 CROHN'S DISEASE OF BOTH SMALL AND LARGE INTESTINE WITH INTESTINAL OBSTRUCTION: ICD-10-CM

## 2023-10-24 PROCEDURE — 99214 OFFICE O/P EST MOD 30 MIN: CPT | Performed by: INTERNAL MEDICINE

## 2023-10-24 RX ORDER — AZITHROMYCIN DIHYDRATE 250 MG/1
TABLET, FILM COATED ORAL
Qty: 6 | Refills: 0 | Status: ON HOLD | COMMUNITY
Start: 2011-12-19

## 2023-10-24 RX ORDER — ROPINIROLE HYDROCHLORIDE 1 MG/1
1 TABLET TABLET, FILM COATED ORAL TWICE A DAY
Status: ACTIVE | COMMUNITY

## 2023-10-24 RX ORDER — USTEKINUMAB 90 MG/ML
AS DIRECTED INJECTION, SOLUTION SUBCUTANEOUS
Qty: 90 MILLIGRAMS | Refills: 6 | Status: ACTIVE | COMMUNITY
Start: 2022-01-26 | End: 2024-07-24

## 2023-10-24 RX ORDER — CYANOCOBALAMIN 1000 UG/ML
AS DIRECTED INJECTION INTRAMUSCULAR; SUBCUTANEOUS
Status: ACTIVE | COMMUNITY

## 2023-10-24 RX ORDER — PROMETHAZINE HYDROCHLORIDE AND CODEINE PHOSPHATE 6.25; 1 MG/5ML; MG/5ML
SOLUTION ORAL
Qty: 120 | Refills: 0 | Status: ON HOLD | COMMUNITY
Start: 2011-09-08

## 2023-10-24 RX ORDER — COLCHICINE 0.6 MG/1
TAKE 1 TABLET DAILY AS DIRECTED TABLET, FILM COATED ORAL
Refills: 0 | Status: ACTIVE | COMMUNITY

## 2023-10-24 RX ORDER — OXYCODONE AND ACETAMINOPHEN 10; 325 MG/1; MG/1
TABLET ORAL
Qty: 60 | Refills: 0 | Status: ON HOLD | COMMUNITY
Start: 2014-08-08

## 2023-10-24 RX ORDER — ZOLPIDEM TARTRATE 5 MG/1
TAKE 1 TABLET AT BEDTIME AS NEEDED TABLET, FILM COATED ORAL
Refills: 0 | Status: ON HOLD | COMMUNITY

## 2023-10-24 RX ORDER — FLUCONAZOLE 100 MG/1
TABLET ORAL
Qty: 10 | Refills: 0 | Status: ON HOLD | COMMUNITY
Start: 2019-04-05

## 2023-10-24 RX ORDER — TRAMADOL HYDROCHLORIDE 50 MG/1
1 TABLET AS NEEDED TABLET, FILM COATED ORAL ONCE A DAY
Status: ON HOLD | COMMUNITY

## 2023-10-24 RX ORDER — BENZONATATE 200 MG/1
CAPSULE ORAL
Qty: 20 | Refills: 0 | Status: ON HOLD | COMMUNITY
Start: 2011-09-08

## 2023-10-24 RX ORDER — METOPROLOL SUCCINATE 50 MG/1
TAKE 1 TABLET DAILY TABLET, EXTENDED RELEASE ORAL
Refills: 0 | Status: ON HOLD | COMMUNITY

## 2023-10-24 RX ORDER — OMEPRAZOLE 20 MG/1
TAKE 1 TABLET DAILY PRN TABLET, DELAYED RELEASE ORAL
Refills: 0 | Status: ACTIVE | COMMUNITY

## 2023-10-24 RX ORDER — PREDNISONE 20 MG/1
1 TABLET TABLET ORAL
Refills: 0 | Status: ON HOLD | COMMUNITY
Start: 2011-06-20

## 2023-10-24 RX ORDER — OMEPRAZOLE 20 MG/1
CAPSULE, DELAYED RELEASE ORAL
Qty: 30 | Refills: 0 | Status: ON HOLD | COMMUNITY
Start: 2013-09-03

## 2023-10-24 RX ORDER — METOPROLOL SUCCINATE 100 MG/1
1 TABLET TABLET, EXTENDED RELEASE ORAL ONCE A DAY
Refills: 0 | Status: ACTIVE | COMMUNITY
Start: 2019-04-05

## 2023-10-24 RX ORDER — OXYCODONE AND ACETAMINOPHEN 5; 325 MG/1; MG/1
TABLET ORAL
Qty: 30 | Refills: 0 | Status: ON HOLD | COMMUNITY
Start: 2013-09-02

## 2023-10-24 RX ORDER — TESTOSTERONE CYPIONATE 200 MG/ML
2 ML INJECTION INTRAMUSCULAR
Status: ACTIVE | COMMUNITY

## 2023-10-24 RX ORDER — LOSARTAN POTASSIUM 50 MG/1
1 TABLET TABLET, FILM COATED ORAL ONCE A DAY
Refills: 0 | Status: ON HOLD | COMMUNITY
Start: 2020-01-10

## 2023-10-24 RX ORDER — FLUTICASONE PROPIONATE 50 UG/1
SPRAY, METERED NASAL
Qty: 16 | Refills: 0 | Status: ON HOLD | COMMUNITY
Start: 2011-12-19

## 2023-10-24 RX ORDER — TESTOSTERONE 16.2 MG/G
GEL TRANSDERMAL
Qty: 75 | Refills: 0 | Status: ON HOLD | COMMUNITY
Start: 2014-02-08

## 2023-10-24 RX ORDER — METOPROLOL SUCCINATE 25 MG/1
TABLET, EXTENDED RELEASE ORAL
Qty: 30 | Refills: 0 | Status: ON HOLD | COMMUNITY
Start: 2013-09-16

## 2023-10-24 RX ORDER — DIPHENOXYLATE HYDROCHLORIDE AND ATROPINE SULFATE 2.5; .025 MG/1; MG/1
TABLET ORAL
Qty: 90 | Refills: 0 | Status: ON HOLD | COMMUNITY
Start: 2013-09-11

## 2023-10-24 RX ORDER — ENOXAPARIN SODIUM 100 MG/ML
INJECTION SUBCUTANEOUS
Qty: 28 | Refills: 0 | Status: ON HOLD | COMMUNITY
Start: 2013-09-03

## 2023-10-24 RX ORDER — DICYCLOMINE HYDROCHLORIDE 10 MG/1
TAKE ONE CAPSULE BY MOUTH EVERY 4 TO 6 HOURS AS NEEDED FOR ABDOMINAL PAIN CAPSULE ORAL
Qty: 60 | Refills: 1 | Status: ON HOLD | COMMUNITY
Start: 2019-10-29

## 2023-10-24 RX ORDER — USTEKINUMAB 90 MG/ML
AS DIRECTED INJECTION, SOLUTION SUBCUTANEOUS
Qty: 90 MILLIGRAMS | Refills: 6
Start: 2022-01-26 | End: 2025-07-15

## 2023-10-24 RX ORDER — FLUOXETINE HYDROCHLORIDE 20 MG/1
1 CAPSULE CAPSULE ORAL ONCE A DAY
Status: ACTIVE | COMMUNITY

## 2023-10-24 RX ORDER — MUPIROCIN 20 MG/G
OINTMENT TOPICAL
Qty: 22 | Refills: 0 | Status: ON HOLD | COMMUNITY
Start: 2012-02-26

## 2023-10-24 RX ORDER — HYOSCYAMINE SULFATE 0.12 MG/1
1 TABLET UNDER THE TONGUE AND ALLOW TO DISSOLVE  AS NEEDED TABLET, ORALLY DISINTEGRATING ORAL THREE TIMES A DAY
Status: ACTIVE | COMMUNITY

## 2023-10-24 RX ORDER — ACETAMINOPHEN AND CODEINE PHOSPHATE 300; 30 MG/1; MG/1
TABLET ORAL
Qty: 25 | Refills: 0 | Status: ON HOLD | COMMUNITY
Start: 2013-05-06

## 2023-10-24 RX ORDER — CEPHALEXIN 500 MG/1
CAPSULE ORAL
Qty: 21 | Refills: 0 | Status: ON HOLD | COMMUNITY
Start: 2012-02-26

## 2023-10-24 RX ORDER — ONDANSETRON HYDROCHLORIDE 4 MG/1
1 TABLET TABLET, FILM COATED ORAL
Refills: 0 | Status: ACTIVE | COMMUNITY
Start: 2019-04-05

## 2023-10-24 RX ORDER — INFLIXIMAB 100 MG/10ML
INFUSE 5MG/KG Q 4 WEEKS INJECTION, POWDER, LYOPHILIZED, FOR SOLUTION INTRAVENOUS
Qty: 1 | Refills: 6 | Status: ON HOLD | COMMUNITY
Start: 2017-02-17

## 2023-10-24 RX ORDER — DIPHENOXYLATE HCL/ATROPINE 2.5-.025MG
3 TABLET TABLET ORAL TWICE A DAY
Refills: 3 | Status: ACTIVE | COMMUNITY

## 2023-10-24 RX ORDER — OXYCODONE HYDROCHLORIDE AND ACETAMINOPHEN 5; 325 MG/1; MG/1
1 TABLET AS NEEDED TABLET ORAL
Status: ACTIVE | COMMUNITY

## 2023-10-24 RX ORDER — MULTIVIT-MIN/FA/LYCOPEN/LUTEIN .4-300-25
TAKE 1 TABLET DAILY PRN TABLET ORAL
Refills: 0 | Status: ON HOLD | COMMUNITY

## 2023-10-24 RX ORDER — AMLODIPINE BESYLATE AND BENAZEPRIL HYDROCHLORIDE 5; 40 MG/1; MG/1
AS DIRECTED CAPSULE ORAL
Status: ACTIVE | COMMUNITY

## 2023-10-27 ENCOUNTER — TELEPHONE ENCOUNTER (OUTPATIENT)
Dept: URBAN - METROPOLITAN AREA CLINIC 113 | Facility: CLINIC | Age: 53
End: 2023-10-27

## 2023-12-29 ENCOUNTER — OUT OF OFFICE VISIT (OUTPATIENT)
Dept: URBAN - METROPOLITAN AREA SURGERY CENTER 25 | Facility: SURGERY CENTER | Age: 53
End: 2023-12-29
Payer: COMMERCIAL

## 2023-12-29 ENCOUNTER — CLAIMS CREATED FROM THE CLAIM WINDOW (OUTPATIENT)
Dept: URBAN - METROPOLITAN AREA CLINIC 4 | Facility: CLINIC | Age: 53
End: 2023-12-29
Payer: COMMERCIAL

## 2023-12-29 ENCOUNTER — TELEPHONE ENCOUNTER (OUTPATIENT)
Dept: URBAN - METROPOLITAN AREA CLINIC 113 | Facility: CLINIC | Age: 53
End: 2023-12-29

## 2023-12-29 DIAGNOSIS — K50.10 CROHN'S DISEASE OF LARGE INTESTINE WITHOUT COMPLICATION: ICD-10-CM

## 2023-12-29 DIAGNOSIS — K50.90 CROHNS DISEASE: ICD-10-CM

## 2023-12-29 DIAGNOSIS — K63.89 OTHER SPECIFIED DISEASES OF INTESTINE: ICD-10-CM

## 2023-12-29 DIAGNOSIS — Z98.0 INTESTINAL BYPASS AND ANASTOMOSIS STATUS: ICD-10-CM

## 2023-12-29 PROCEDURE — 00811 ANES LWR INTST NDSC NOS: CPT | Performed by: ANESTHESIOLOGY

## 2023-12-29 PROCEDURE — 88305 TISSUE EXAM BY PATHOLOGIST: CPT | Performed by: PATHOLOGY

## 2023-12-29 PROCEDURE — 44389 COLONOSCOPY WITH BIOPSY: CPT | Performed by: INTERNAL MEDICINE

## 2023-12-29 PROCEDURE — G8907 PT DOC NO EVENTS ON DISCHARG: HCPCS | Performed by: INTERNAL MEDICINE

## 2023-12-29 RX ORDER — TESTOSTERONE CYPIONATE 200 MG/ML
2 ML INJECTION INTRAMUSCULAR
Status: ACTIVE | COMMUNITY

## 2023-12-29 RX ORDER — TESTOSTERONE 16.2 MG/G
GEL TRANSDERMAL
Qty: 75 | Refills: 0 | Status: ON HOLD | COMMUNITY
Start: 2014-02-08

## 2023-12-29 RX ORDER — ROPINIROLE HYDROCHLORIDE 1 MG/1
1 TABLET TABLET, FILM COATED ORAL TWICE A DAY
Status: ACTIVE | COMMUNITY

## 2023-12-29 RX ORDER — FLUCONAZOLE 100 MG/1
TABLET ORAL
Qty: 10 | Refills: 0 | Status: ON HOLD | COMMUNITY
Start: 2019-04-05

## 2023-12-29 RX ORDER — MULTIVIT-MIN/FA/LYCOPEN/LUTEIN .4-300-25
TAKE 1 TABLET DAILY PRN TABLET ORAL
Refills: 0 | Status: ON HOLD | COMMUNITY

## 2023-12-29 RX ORDER — PROMETHAZINE HYDROCHLORIDE AND CODEINE PHOSPHATE 6.25; 1 MG/5ML; MG/5ML
SOLUTION ORAL
Qty: 120 | Refills: 0 | Status: ON HOLD | COMMUNITY
Start: 2011-09-08

## 2023-12-29 RX ORDER — TRAMADOL HYDROCHLORIDE 50 MG/1
1 TABLET AS NEEDED TABLET, FILM COATED ORAL ONCE A DAY
Status: ON HOLD | COMMUNITY

## 2023-12-29 RX ORDER — OMEPRAZOLE 20 MG/1
TAKE 1 TABLET DAILY PRN TABLET, DELAYED RELEASE ORAL
Refills: 0 | Status: ACTIVE | COMMUNITY

## 2023-12-29 RX ORDER — OXYCODONE HYDROCHLORIDE AND ACETAMINOPHEN 5; 325 MG/1; MG/1
1 TABLET AS NEEDED TABLET ORAL
Status: ACTIVE | COMMUNITY

## 2023-12-29 RX ORDER — DIPHENOXYLATE HYDROCHLORIDE AND ATROPINE SULFATE 2.5; .025 MG/1; MG/1
TABLET ORAL
Qty: 90 | Refills: 0 | Status: ON HOLD | COMMUNITY
Start: 2013-09-11

## 2023-12-29 RX ORDER — INFLIXIMAB 100 MG/10ML
INFUSE 5MG/KG Q 4 WEEKS INJECTION, POWDER, LYOPHILIZED, FOR SOLUTION INTRAVENOUS
Qty: 1 | Refills: 6 | Status: ON HOLD | COMMUNITY
Start: 2017-02-17

## 2023-12-29 RX ORDER — CHOLESTYRAMINE 4 G/9G
1 SCOOP POWDER, FOR SUSPENSION ORAL ONCE A DAY
Qty: 378 GRAMS | Refills: 3 | OUTPATIENT
Start: 2023-12-29

## 2023-12-29 RX ORDER — CEPHALEXIN 500 MG/1
CAPSULE ORAL
Qty: 21 | Refills: 0 | Status: ON HOLD | COMMUNITY
Start: 2012-02-26

## 2023-12-29 RX ORDER — AMLODIPINE BESYLATE AND BENAZEPRIL HYDROCHLORIDE 5; 40 MG/1; MG/1
AS DIRECTED CAPSULE ORAL
Status: ACTIVE | COMMUNITY

## 2023-12-29 RX ORDER — MUPIROCIN 20 MG/G
OINTMENT TOPICAL
Qty: 22 | Refills: 0 | Status: ON HOLD | COMMUNITY
Start: 2012-02-26

## 2023-12-29 RX ORDER — HYOSCYAMINE SULFATE 0.12 MG/1
1 TABLET UNDER THE TONGUE AND ALLOW TO DISSOLVE  AS NEEDED TABLET, ORALLY DISINTEGRATING ORAL THREE TIMES A DAY
Status: ACTIVE | COMMUNITY

## 2023-12-29 RX ORDER — ENOXAPARIN SODIUM 100 MG/ML
INJECTION SUBCUTANEOUS
Qty: 28 | Refills: 0 | Status: ON HOLD | COMMUNITY
Start: 2013-09-03

## 2023-12-29 RX ORDER — DIPHENOXYLATE HCL/ATROPINE 2.5-.025MG
3 TABLET TABLET ORAL TWICE A DAY
Refills: 3 | Status: ACTIVE | COMMUNITY

## 2023-12-29 RX ORDER — ZOLPIDEM TARTRATE 5 MG/1
TAKE 1 TABLET AT BEDTIME AS NEEDED TABLET, FILM COATED ORAL
Refills: 0 | Status: ON HOLD | COMMUNITY

## 2023-12-29 RX ORDER — CYANOCOBALAMIN 1000 UG/ML
AS DIRECTED INJECTION INTRAMUSCULAR; SUBCUTANEOUS
Status: ACTIVE | COMMUNITY

## 2023-12-29 RX ORDER — OMEPRAZOLE 20 MG/1
CAPSULE, DELAYED RELEASE ORAL
Qty: 30 | Refills: 0 | Status: ON HOLD | COMMUNITY
Start: 2013-09-03

## 2023-12-29 RX ORDER — BENZONATATE 200 MG/1
CAPSULE ORAL
Qty: 20 | Refills: 0 | Status: ON HOLD | COMMUNITY
Start: 2011-09-08

## 2023-12-29 RX ORDER — FLUOXETINE HYDROCHLORIDE 20 MG/1
1 CAPSULE CAPSULE ORAL ONCE A DAY
Status: ACTIVE | COMMUNITY

## 2023-12-29 RX ORDER — DICYCLOMINE HYDROCHLORIDE 10 MG/1
TAKE ONE CAPSULE BY MOUTH EVERY 4 TO 6 HOURS AS NEEDED FOR ABDOMINAL PAIN CAPSULE ORAL
Qty: 60 | Refills: 1 | Status: ON HOLD | COMMUNITY
Start: 2019-10-29

## 2023-12-29 RX ORDER — COLCHICINE 0.6 MG/1
TAKE 1 TABLET DAILY AS DIRECTED TABLET, FILM COATED ORAL
Refills: 0 | Status: ACTIVE | COMMUNITY

## 2023-12-29 RX ORDER — OXYCODONE AND ACETAMINOPHEN 5; 325 MG/1; MG/1
TABLET ORAL
Qty: 30 | Refills: 0 | Status: ON HOLD | COMMUNITY
Start: 2013-09-02

## 2023-12-29 RX ORDER — METOPROLOL SUCCINATE 50 MG/1
TAKE 1 TABLET DAILY TABLET, EXTENDED RELEASE ORAL
Refills: 0 | Status: ON HOLD | COMMUNITY

## 2023-12-29 RX ORDER — ONDANSETRON HYDROCHLORIDE 4 MG/1
1 TABLET TABLET, FILM COATED ORAL
Refills: 0 | Status: ACTIVE | COMMUNITY
Start: 2019-04-05

## 2023-12-29 RX ORDER — METOPROLOL SUCCINATE 25 MG/1
TABLET, EXTENDED RELEASE ORAL
Qty: 30 | Refills: 0 | Status: ON HOLD | COMMUNITY
Start: 2013-09-16

## 2023-12-29 RX ORDER — PREDNISONE 20 MG/1
1 TABLET TABLET ORAL
Refills: 0 | Status: ON HOLD | COMMUNITY
Start: 2011-06-20

## 2023-12-29 RX ORDER — OXYCODONE AND ACETAMINOPHEN 10; 325 MG/1; MG/1
TABLET ORAL
Qty: 60 | Refills: 0 | Status: ON HOLD | COMMUNITY
Start: 2014-08-08

## 2023-12-29 RX ORDER — AZITHROMYCIN DIHYDRATE 250 MG/1
TABLET, FILM COATED ORAL
Qty: 6 | Refills: 0 | Status: ON HOLD | COMMUNITY
Start: 2011-12-19

## 2023-12-29 RX ORDER — LOSARTAN POTASSIUM 50 MG/1
1 TABLET TABLET, FILM COATED ORAL ONCE A DAY
Refills: 0 | Status: ON HOLD | COMMUNITY
Start: 2020-01-10

## 2023-12-29 RX ORDER — FLUTICASONE PROPIONATE 50 UG/1
SPRAY, METERED NASAL
Qty: 16 | Refills: 0 | Status: ON HOLD | COMMUNITY
Start: 2011-12-19

## 2023-12-29 RX ORDER — METOPROLOL SUCCINATE 100 MG/1
1 TABLET TABLET, EXTENDED RELEASE ORAL ONCE A DAY
Refills: 0 | Status: ACTIVE | COMMUNITY
Start: 2019-04-05

## 2023-12-29 RX ORDER — USTEKINUMAB 90 MG/ML
AS DIRECTED INJECTION, SOLUTION SUBCUTANEOUS
Qty: 90 MILLIGRAMS | Refills: 6 | Status: ACTIVE | COMMUNITY
Start: 2022-01-26 | End: 2025-07-15

## 2023-12-29 RX ORDER — ACETAMINOPHEN AND CODEINE PHOSPHATE 300; 30 MG/1; MG/1
TABLET ORAL
Qty: 25 | Refills: 0 | Status: ON HOLD | COMMUNITY
Start: 2013-05-06

## 2024-01-11 ENCOUNTER — TELEPHONE ENCOUNTER (OUTPATIENT)
Dept: URBAN - METROPOLITAN AREA CLINIC 113 | Facility: CLINIC | Age: 54
End: 2024-01-11

## 2024-01-29 ENCOUNTER — TELEPHONE ENCOUNTER (OUTPATIENT)
Dept: URBAN - METROPOLITAN AREA CLINIC 113 | Facility: CLINIC | Age: 54
End: 2024-01-29

## 2024-03-18 ENCOUNTER — OV EP (OUTPATIENT)
Dept: URBAN - METROPOLITAN AREA CLINIC 113 | Facility: CLINIC | Age: 54
End: 2024-03-18
Payer: COMMERCIAL

## 2024-03-18 VITALS
RESPIRATION RATE: 14 BRPM | TEMPERATURE: 97.4 F | HEIGHT: 71 IN | WEIGHT: 227 LBS | BODY MASS INDEX: 31.78 KG/M2 | HEART RATE: 74 BPM | DIASTOLIC BLOOD PRESSURE: 77 MMHG | SYSTOLIC BLOOD PRESSURE: 148 MMHG

## 2024-03-18 DIAGNOSIS — K76.0 FATTY (CHANGE OF) LIVER, NOT ELSEWHERE CLASSIFIED: ICD-10-CM

## 2024-03-18 DIAGNOSIS — K50.812 CROHN'S DISEASE OF BOTH SMALL AND LARGE INTESTINE WITH INTESTINAL OBSTRUCTION: ICD-10-CM

## 2024-03-18 DIAGNOSIS — Z79.899 HIGH RISK MEDICATION USE: ICD-10-CM

## 2024-03-18 PROCEDURE — 99214 OFFICE O/P EST MOD 30 MIN: CPT | Performed by: INTERNAL MEDICINE

## 2024-03-18 RX ORDER — AZITHROMYCIN DIHYDRATE 250 MG/1
TABLET, FILM COATED ORAL
Qty: 6 | Refills: 0 | Status: ON HOLD | COMMUNITY
Start: 2011-12-19

## 2024-03-18 RX ORDER — METOPROLOL SUCCINATE 50 MG/1
TAKE 1 TABLET DAILY TABLET, EXTENDED RELEASE ORAL
Refills: 0 | Status: ON HOLD | COMMUNITY

## 2024-03-18 RX ORDER — CEPHALEXIN 500 MG/1
CAPSULE ORAL
Qty: 21 | Refills: 0 | Status: ON HOLD | COMMUNITY
Start: 2012-02-26

## 2024-03-18 RX ORDER — BENZONATATE 200 MG/1
CAPSULE ORAL
Qty: 20 | Refills: 0 | Status: ON HOLD | COMMUNITY
Start: 2011-09-08

## 2024-03-18 RX ORDER — OMEPRAZOLE 20 MG/1
CAPSULE, DELAYED RELEASE ORAL
Qty: 30 | Refills: 0 | Status: ON HOLD | COMMUNITY
Start: 2013-09-03

## 2024-03-18 RX ORDER — CYANOCOBALAMIN 1000 UG/ML
AS DIRECTED INJECTION INTRAMUSCULAR; SUBCUTANEOUS
Status: ACTIVE | COMMUNITY

## 2024-03-18 RX ORDER — ACETAMINOPHEN AND CODEINE PHOSPHATE 300; 30 MG/1; MG/1
TABLET ORAL
Qty: 25 | Refills: 0 | Status: ON HOLD | COMMUNITY
Start: 2013-05-06

## 2024-03-18 RX ORDER — FLUTICASONE PROPIONATE 50 UG/1
SPRAY, METERED NASAL
Qty: 16 | Refills: 0 | Status: ON HOLD | COMMUNITY
Start: 2011-12-19

## 2024-03-18 RX ORDER — INFLIXIMAB 100 MG/10ML
INFUSE 5MG/KG Q 4 WEEKS INJECTION, POWDER, LYOPHILIZED, FOR SOLUTION INTRAVENOUS
Qty: 1 | Refills: 6 | Status: ON HOLD | COMMUNITY
Start: 2017-02-17

## 2024-03-18 RX ORDER — LOSARTAN POTASSIUM 50 MG/1
1 TABLET TABLET, FILM COATED ORAL ONCE A DAY
Refills: 0 | Status: ON HOLD | COMMUNITY
Start: 2020-01-10

## 2024-03-18 RX ORDER — OXYCODONE HYDROCHLORIDE AND ACETAMINOPHEN 5; 325 MG/1; MG/1
1 TABLET AS NEEDED TABLET ORAL
Status: ACTIVE | COMMUNITY

## 2024-03-18 RX ORDER — TESTOSTERONE CYPIONATE 200 MG/ML
2 ML INJECTION INTRAMUSCULAR
Status: ACTIVE | COMMUNITY

## 2024-03-18 RX ORDER — DIPHENOXYLATE HCL/ATROPINE 2.5-.025MG
3 TABLET TABLET ORAL TWICE A DAY
Refills: 3 | Status: ON HOLD | COMMUNITY

## 2024-03-18 RX ORDER — AMLODIPINE BESYLATE AND BENAZEPRIL HYDROCHLORIDE 5; 40 MG/1; MG/1
AS DIRECTED CAPSULE ORAL
Status: ACTIVE | COMMUNITY

## 2024-03-18 RX ORDER — ONDANSETRON HYDROCHLORIDE 4 MG/1
1 TABLET TABLET, FILM COATED ORAL
Qty: 30 | Refills: 1
Start: 2019-04-05

## 2024-03-18 RX ORDER — ONDANSETRON HYDROCHLORIDE 4 MG/1
1 TABLET TABLET, FILM COATED ORAL
Refills: 0 | Status: ACTIVE | COMMUNITY
Start: 2019-04-05

## 2024-03-18 RX ORDER — MUPIROCIN 20 MG/G
OINTMENT TOPICAL
Qty: 22 | Refills: 0 | Status: ON HOLD | COMMUNITY
Start: 2012-02-26

## 2024-03-18 RX ORDER — FLUOXETINE HYDROCHLORIDE 20 MG/1
1 CAPSULE CAPSULE ORAL ONCE A DAY
Status: ACTIVE | COMMUNITY

## 2024-03-18 RX ORDER — OMEPRAZOLE 20 MG/1
TAKE 1 TABLET DAILY PRN TABLET, DELAYED RELEASE ORAL
Refills: 0 | Status: ON HOLD | COMMUNITY

## 2024-03-18 RX ORDER — USTEKINUMAB 90 MG/ML
AS DIRECTED INJECTION, SOLUTION SUBCUTANEOUS
Qty: 90 MILLIGRAMS | Refills: 6 | Status: ACTIVE | COMMUNITY
Start: 2022-01-26 | End: 2025-07-15

## 2024-03-18 RX ORDER — METOPROLOL SUCCINATE 25 MG/1
TABLET, EXTENDED RELEASE ORAL
Qty: 30 | Refills: 0 | Status: ON HOLD | COMMUNITY
Start: 2013-09-16

## 2024-03-18 RX ORDER — HYOSCYAMINE SULFATE 0.12 MG/1
1 TABLET UNDER THE TONGUE AND ALLOW TO DISSOLVE  AS NEEDED TABLET, ORALLY DISINTEGRATING ORAL THREE TIMES A DAY
Status: ACTIVE | COMMUNITY

## 2024-03-18 RX ORDER — FLUCONAZOLE 100 MG/1
TABLET ORAL
Qty: 10 | Refills: 0 | Status: ON HOLD | COMMUNITY
Start: 2019-04-05

## 2024-03-18 RX ORDER — TESTOSTERONE 16.2 MG/G
GEL TRANSDERMAL
Qty: 75 | Refills: 0 | Status: ON HOLD | COMMUNITY
Start: 2014-02-08

## 2024-03-18 RX ORDER — ZOLPIDEM TARTRATE 5 MG/1
TAKE 1 TABLET AT BEDTIME AS NEEDED TABLET, FILM COATED ORAL
Refills: 0 | Status: ON HOLD | COMMUNITY

## 2024-03-18 RX ORDER — ENOXAPARIN SODIUM 100 MG/ML
INJECTION SUBCUTANEOUS
Qty: 28 | Refills: 0 | Status: ON HOLD | COMMUNITY
Start: 2013-09-03

## 2024-03-18 RX ORDER — MULTIVIT-MIN/FA/LYCOPEN/LUTEIN .4-300-25
TAKE 1 TABLET DAILY PRN TABLET ORAL
Refills: 0 | Status: ON HOLD | COMMUNITY

## 2024-03-18 RX ORDER — CHOLESTYRAMINE 4 G/9G
1 SCOOP POWDER, FOR SUSPENSION ORAL ONCE A DAY
Qty: 378 GRAMS | Refills: 3 | Status: ACTIVE | COMMUNITY
Start: 2023-12-29

## 2024-03-18 RX ORDER — TRAMADOL HYDROCHLORIDE 50 MG/1
1 TABLET AS NEEDED TABLET, FILM COATED ORAL ONCE A DAY
Status: ON HOLD | COMMUNITY

## 2024-03-18 RX ORDER — PREDNISONE 20 MG/1
1 TABLET TABLET ORAL
Refills: 0 | Status: ON HOLD | COMMUNITY
Start: 2011-06-20

## 2024-03-18 RX ORDER — METOPROLOL SUCCINATE 100 MG/1
1 TABLET TABLET, EXTENDED RELEASE ORAL ONCE A DAY
Refills: 0 | Status: ACTIVE | COMMUNITY
Start: 2019-04-05

## 2024-03-18 RX ORDER — OXYCODONE AND ACETAMINOPHEN 5; 325 MG/1; MG/1
TABLET ORAL
Qty: 30 | Refills: 0 | Status: ON HOLD | COMMUNITY
Start: 2013-09-02

## 2024-03-18 RX ORDER — DICYCLOMINE HYDROCHLORIDE 10 MG/1
TAKE ONE CAPSULE BY MOUTH EVERY 4 TO 6 HOURS AS NEEDED FOR ABDOMINAL PAIN CAPSULE ORAL
Qty: 60 | Refills: 1 | Status: ON HOLD | COMMUNITY
Start: 2019-10-29

## 2024-03-18 RX ORDER — COLCHICINE 0.6 MG/1
TAKE 1 TABLET DAILY AS DIRECTED TABLET, FILM COATED ORAL
Refills: 0 | Status: ACTIVE | COMMUNITY

## 2024-03-18 RX ORDER — DIPHENOXYLATE HYDROCHLORIDE AND ATROPINE SULFATE 2.5; .025 MG/1; MG/1
TABLET ORAL
Qty: 90 | Refills: 0 | Status: ON HOLD | COMMUNITY
Start: 2013-09-11

## 2024-03-18 RX ORDER — FAMOTIDINE 20 MG/1
1 TABLET AT BEDTIME AS NEEDED TABLET, FILM COATED ORAL ONCE A DAY
Status: ACTIVE | COMMUNITY

## 2024-03-18 RX ORDER — OXYCODONE AND ACETAMINOPHEN 10; 325 MG/1; MG/1
TABLET ORAL
Qty: 60 | Refills: 0 | Status: ON HOLD | COMMUNITY
Start: 2014-08-08

## 2024-03-18 RX ORDER — PROMETHAZINE HYDROCHLORIDE AND CODEINE PHOSPHATE 6.25; 1 MG/5ML; MG/5ML
SOLUTION ORAL
Qty: 120 | Refills: 0 | Status: ON HOLD | COMMUNITY
Start: 2011-09-08

## 2024-03-18 RX ORDER — ROPINIROLE HYDROCHLORIDE 1 MG/1
1 TABLET TABLET, FILM COATED ORAL TWICE A DAY
Status: ACTIVE | COMMUNITY

## 2024-03-18 NOTE — HPI-TODAY'S VISIT:
Patient returns today in follow-up.  He had C. difficile in January.  Was given vancomycin.  He reports he is at his baseline today.  He empties his bag 5 or 6 times.  Questran helps some but not consistently.  He denies any abdominal pain.  No new weight loss.  No bleeding.  Remains on Stelara without any difficulties.

## 2024-03-19 LAB
A/G RATIO: 1.3
ABSOLUTE BASOPHILS: 64
ABSOLUTE EOSINOPHILS: 294
ABSOLUTE LYMPHOCYTES: 2033
ABSOLUTE MONOCYTES: 773
ABSOLUTE NEUTROPHILS: 6035
ALBUMIN: 4
ALKALINE PHOSPHATASE: 52
ALT (SGPT): 23
AST (SGOT): 24
BASOPHILS: 0.7
BILIRUBIN, TOTAL: 0.4
BUN/CREATININE RATIO: 15
BUN: 34
C-REACTIVE PROTEIN, QUANT: 2.9
CALCIUM: 9.3
CARBON DIOXIDE, TOTAL: 26
CHLORIDE: 105
CREATININE: 2.29
EGFR: 33
EOSINOPHILS: 3.2
GLOBULIN, TOTAL: 3.2
GLUCOSE: 103
HEMATOCRIT: 41.2
HEMOGLOBIN: 13.4
LYMPHOCYTES: 22.1
MCH: 32.8
MCHC: 32.5
MCV: 101
MONOCYTES: 8.4
MPV: 9.8
NEUTROPHILS: 65.6
PLATELET COUNT: 316
POTASSIUM: 4.3
PROTEIN, TOTAL: 7.2
RDW: 14.2
RED BLOOD CELL COUNT: 4.08
SODIUM: 141
WHITE BLOOD CELL COUNT: 9.2

## 2024-12-09 ENCOUNTER — TELEPHONE ENCOUNTER (OUTPATIENT)
Dept: URBAN - METROPOLITAN AREA CLINIC 113 | Facility: CLINIC | Age: 54
End: 2024-12-09

## 2024-12-09 RX ORDER — USTEKINUMAB 90 MG/ML
AS DIRECTED INJECTION, SOLUTION SUBCUTANEOUS
Qty: 90 MILLIGRAMS | Refills: 6
Start: 2022-01-26 | End: 2026-08-31

## 2024-12-13 ENCOUNTER — TELEPHONE ENCOUNTER (OUTPATIENT)
Dept: URBAN - METROPOLITAN AREA CLINIC 113 | Facility: CLINIC | Age: 54
End: 2024-12-13

## 2024-12-13 RX ORDER — USTEKINUMAB 90 MG/ML
AS DIRECTED INJECTION, SOLUTION SUBCUTANEOUS
Qty: 1 APPLICATOR | Refills: 6 | OUTPATIENT
Start: 2024-12-13 | End: 2026-02-06